# Patient Record
Sex: FEMALE
[De-identification: names, ages, dates, MRNs, and addresses within clinical notes are randomized per-mention and may not be internally consistent; named-entity substitution may affect disease eponyms.]

---

## 2018-04-18 ENCOUNTER — HOSPITAL ENCOUNTER (EMERGENCY)
Dept: HOSPITAL 18 - NAV ERS | Age: 23
Discharge: HOME | End: 2018-04-18
Payer: COMMERCIAL

## 2018-04-18 DIAGNOSIS — D64.9: ICD-10-CM

## 2018-04-18 DIAGNOSIS — R11.2: ICD-10-CM

## 2018-04-18 DIAGNOSIS — J45.909: ICD-10-CM

## 2018-04-18 DIAGNOSIS — N39.0: Primary | ICD-10-CM

## 2018-04-18 LAB
BACTERIA UR QL AUTO: (no result) HPF
RBC UR QL AUTO: (no result) HPF (ref 0–3)
SP GR UR STRIP: 1.02 (ref 1–1.03)
WBC UR QL AUTO: (no result) HPF (ref 0–3)

## 2018-04-18 PROCEDURE — 81003 URINALYSIS AUTO W/O SCOPE: CPT

## 2018-04-18 PROCEDURE — 81015 MICROSCOPIC EXAM OF URINE: CPT

## 2018-04-18 PROCEDURE — 99284 EMERGENCY DEPT VISIT MOD MDM: CPT

## 2018-05-05 ENCOUNTER — HOSPITAL ENCOUNTER (EMERGENCY)
Dept: HOSPITAL 18 - NAV ERS | Age: 23
Discharge: HOME | End: 2018-05-05
Payer: COMMERCIAL

## 2018-05-05 DIAGNOSIS — J04.0: Primary | ICD-10-CM

## 2018-05-05 DIAGNOSIS — J45.909: ICD-10-CM

## 2018-05-05 PROCEDURE — 70360 X-RAY EXAM OF NECK: CPT

## 2018-05-05 PROCEDURE — 87081 CULTURE SCREEN ONLY: CPT

## 2018-05-05 PROCEDURE — 87430 STREP A AG IA: CPT

## 2018-05-05 PROCEDURE — 87804 INFLUENZA ASSAY W/OPTIC: CPT

## 2018-05-05 PROCEDURE — 71046 X-RAY EXAM CHEST 2 VIEWS: CPT

## 2018-05-05 NOTE — RAD
CHEST TWO VIEWS:

5/5/18

 

HISTORY: 

23-year-old female with  history of cough and sore throat. 

 

Heart size is within normal limits. The lungs are clear. No pneumonia, edema, or pleural effusion. 

 

IMPRESSION:  

No acute intrathoracic disease. No evidence for pneumonia.

 

 

POS: [facility]

## 2018-05-05 NOTE — RAD
SOFT TISSUE NECK TWO VIEWS:

5/5/18

 

HISTORY: 

23-year-old female with history of cough and sore throat.

 

The epiglottis appears to be within normal limits. No evidence for an overt foreign body. No retropha
ryngeal soft tissue mass. The glottis and subglottic regions appear unremarkable. 

 

IMPRESSION:  

Unremarkable soft tissue neck. 

 

POS: [facility]

## 2018-11-23 ENCOUNTER — HOSPITAL ENCOUNTER (EMERGENCY)
Dept: HOSPITAL 18 - NAV ERS | Age: 23
Discharge: HOME | End: 2018-11-23
Payer: COMMERCIAL

## 2018-11-23 DIAGNOSIS — F32.9: ICD-10-CM

## 2018-11-23 DIAGNOSIS — S80.11XA: ICD-10-CM
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
## 2018-11-23 DIAGNOSIS — S06.9X9A: Primary | ICD-10-CM

## 2018-11-23 DIAGNOSIS — S80.12XA: ICD-10-CM

## 2018-11-23 DIAGNOSIS — J45.909: ICD-10-CM

## 2018-11-23 DIAGNOSIS — S05.11XA: ICD-10-CM

## 2018-11-23 DIAGNOSIS — F17.210: ICD-10-CM

## 2018-11-23 DIAGNOSIS — Z79.899: ICD-10-CM

## 2018-11-23 DIAGNOSIS — S01.311A: ICD-10-CM

## 2018-11-23 DIAGNOSIS — F41.9: ICD-10-CM

## 2018-11-23 PROCEDURE — 70486 CT MAXILLOFACIAL W/O DYE: CPT

## 2018-11-23 PROCEDURE — 70450 CT HEAD/BRAIN W/O DYE: CPT

## 2018-11-23 PROCEDURE — 71046 X-RAY EXAM CHEST 2 VIEWS: CPT
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
## 2019-03-01 ENCOUNTER — HOSPITAL ENCOUNTER (EMERGENCY)
Dept: HOSPITAL 18 - NAV ERS | Age: 24
Discharge: HOME | End: 2019-03-01
Payer: SELF-PAY

## 2019-03-01 DIAGNOSIS — J45.909: ICD-10-CM

## 2019-03-01 DIAGNOSIS — M94.0: ICD-10-CM

## 2019-03-01 DIAGNOSIS — Z79.899: ICD-10-CM

## 2019-03-01 DIAGNOSIS — J20.9: Primary | ICD-10-CM

## 2019-03-01 DIAGNOSIS — F17.210: ICD-10-CM

## 2019-03-01 DIAGNOSIS — F32.9: ICD-10-CM

## 2019-03-01 DIAGNOSIS — F41.9: ICD-10-CM

## 2019-03-01 PROCEDURE — 84484 ASSAY OF TROPONIN QUANT: CPT

## 2019-03-01 PROCEDURE — 93005 ELECTROCARDIOGRAM TRACING: CPT

## 2019-03-01 PROCEDURE — 71046 X-RAY EXAM CHEST 2 VIEWS: CPT

## 2019-03-01 PROCEDURE — 87804 INFLUENZA ASSAY W/OPTIC: CPT

## 2019-03-01 NOTE — RAD
TWO VIEWS CHEST:

3/1/19

 

HISTORY: 

Cough, congestion, chest pressure for two days.

 

PA and lateral views of the chest obtained on 3/1/19.

 

Comparison made to previous exam from 11/23/18. 

 

Two views chest demonstrate the lungs to be well aerated. No evidence of active intrathoracic disease
 seen. No evidence of effusions, pneumonia or pneumothorax seen. No evidence of free intraperitoneal 
air seen. 

 

IMPRESSION:  

Unremarkable two views chest. 

 

POS: [facility]

## 2019-12-28 ENCOUNTER — HOSPITAL ENCOUNTER (EMERGENCY)
Dept: HOSPITAL 18 - NAV ERS | Age: 24
Discharge: HOME | End: 2019-12-28
Payer: SELF-PAY

## 2019-12-28 DIAGNOSIS — Z79.899: ICD-10-CM

## 2019-12-28 DIAGNOSIS — J45.901: Primary | ICD-10-CM

## 2019-12-28 DIAGNOSIS — F32.9: ICD-10-CM

## 2019-12-28 DIAGNOSIS — Z87.891: ICD-10-CM

## 2019-12-28 DIAGNOSIS — F41.9: ICD-10-CM

## 2019-12-28 PROCEDURE — 96372 THER/PROPH/DIAG INJ SC/IM: CPT

## 2019-12-28 PROCEDURE — 94640 AIRWAY INHALATION TREATMENT: CPT

## 2020-01-26 ENCOUNTER — HOSPITAL ENCOUNTER (EMERGENCY)
Dept: HOSPITAL 18 - NAV ERS | Age: 25
Discharge: HOME | End: 2020-01-26
Payer: SELF-PAY

## 2020-01-26 DIAGNOSIS — F41.9: ICD-10-CM

## 2020-01-26 DIAGNOSIS — F17.210: ICD-10-CM

## 2020-01-26 DIAGNOSIS — Z79.899: ICD-10-CM

## 2020-01-26 DIAGNOSIS — F32.9: ICD-10-CM

## 2020-01-26 DIAGNOSIS — F10.129: Primary | ICD-10-CM
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
## 2020-01-26 DIAGNOSIS — J45.909: ICD-10-CM

## 2020-01-26 LAB
ALBUMIN SERPL BCG-MCNC: 4.5 G/DL (ref 3.5–5)
ALP SERPL-CCNC: 95 U/L (ref 40–110)
ALT SERPL W P-5'-P-CCNC: 20 U/L (ref 8–55)
ANION GAP SERPL CALC-SCNC: 17 MMOL/L (ref 10–20)
AST SERPL-CCNC: 16 U/L (ref 5–34)
BACTERIA UR QL AUTO: (no result) HPF
BASOPHILS # BLD AUTO: 0.1 THOU/UL (ref 0–0.2)
BASOPHILS NFR BLD AUTO: 1 % (ref 0–1)
BILIRUB SERPL-MCNC: 0.2 MG/DL (ref 0.2–1.2)
BUN SERPL-MCNC: 9 MG/DL (ref 7–18.7)
CALCIUM SERPL-MCNC: 9.2 MG/DL (ref 7.8–10.44)
CHLORIDE SERPL-SCNC: 108 MMOL/L (ref 98–107)
CO2 SERPL-SCNC: 22 MMOL/L (ref 22–29)
CREAT CL PREDICTED SERPL C-G-VRATE: 0 ML/MIN (ref 70–130)
DRUG SCREEN CUTOFF: (no result)
EOSINOPHIL # BLD AUTO: 0.3 THOU/UL (ref 0–0.7)
EOSINOPHIL NFR BLD AUTO: 2.7 % (ref 0–10)
GLOBULIN SER CALC-MCNC: 3.1 G/DL (ref 2.4–3.5)
GLUCOSE SERPL-MCNC: 105 MG/DL (ref 70–105)
HGB BLD-MCNC: 13.5 G/DL (ref 12–16)
LYMPHOCYTES # BLD AUTO: 3.1 THOU/UL (ref 1.2–3.4)
LYMPHOCYTES NFR BLD AUTO: 33.3 % (ref 21–51)
MCH RBC QN AUTO: 26.6 PG (ref 27–31)
MCV RBC AUTO: 80.3 FL (ref 78–98)
MEDTOX CONTROL LINE VALID?: (no result)
MONOCYTES # BLD AUTO: 0.5 THOU/UL (ref 0.11–0.59)
MONOCYTES NFR BLD AUTO: 4.8 % (ref 0–10)
NEUTROPHILS # BLD AUTO: 5.5 THOU/UL (ref 1.4–6.5)
NEUTROPHILS NFR BLD AUTO: 58.2 % (ref 42–75)
PLATELET # BLD AUTO: 350 THOU/UL (ref 130–400)
POTASSIUM SERPL-SCNC: 3.2 MMOL/L (ref 3.5–5.1)
PREGU CONTROL BACKGROUND?: (no result)
PREGU CONTROL BAR APPEAR?: YES
PROT UR STRIP.AUTO-MCNC: 30 MG/DL
RBC # BLD AUTO: 5.08 MILL/UL (ref 4.2–5.4)
RBC UR QL AUTO: (no result) HPF (ref 0–3)
SODIUM SERPL-SCNC: 144 MMOL/L (ref 136–145)
WBC # BLD AUTO: 9.4 THOU/UL (ref 4.8–10.8)
WBC UR QL AUTO: (no result) HPF (ref 0–3)

## 2020-01-26 PROCEDURE — 80306 DRUG TEST PRSMV INSTRMNT: CPT

## 2020-01-26 PROCEDURE — 70450 CT HEAD/BRAIN W/O DYE: CPT

## 2020-01-26 PROCEDURE — 80307 DRUG TEST PRSMV CHEM ANLYZR: CPT

## 2020-01-26 PROCEDURE — 81003 URINALYSIS AUTO W/O SCOPE: CPT

## 2020-01-26 PROCEDURE — 80053 COMPREHEN METABOLIC PANEL: CPT

## 2020-01-26 PROCEDURE — 81015 MICROSCOPIC EXAM OF URINE: CPT

## 2020-01-26 PROCEDURE — 81025 URINE PREGNANCY TEST: CPT

## 2020-01-26 PROCEDURE — 85025 COMPLETE CBC W/AUTO DIFF WBC: CPT

## 2020-01-26 NOTE — CT
CT BRAIN WITHOUT CONTRAST:

 

HISTORY:

Altered mental status.

 

COMPARISON:

11/23/2018

 

TECHNIQUE:

Multiple contiguous axial images were obtained in a CT of the brain without contrast.

 

FINDINGS:

The brain is normal in morphology and attenuation without focal lesions or confluent areas of infarct
ion. There is no evidence of hydrocephalus, intracranial hemorrhage or extraaxial fluid collection.

 

The calvarium and overlying soft tissues are unremarkable. The visualized paranasal sinuses and masto
id air cells are well aerated.

 

IMPRESSION:

No evidence of acute intracranial abnormality.

 

POS: [facility]

## 2020-07-01 ENCOUNTER — HOSPITAL ENCOUNTER (EMERGENCY)
Dept: HOSPITAL 18 - NAV ERS | Age: 25
LOS: 1 days | Discharge: HOME | End: 2020-07-02
Payer: SELF-PAY

## 2020-07-01 DIAGNOSIS — F32.9: ICD-10-CM

## 2020-07-01 DIAGNOSIS — R09.81: ICD-10-CM

## 2020-07-01 DIAGNOSIS — R05: ICD-10-CM

## 2020-07-01 DIAGNOSIS — R50.9: ICD-10-CM

## 2020-07-01 DIAGNOSIS — J02.9: Primary | ICD-10-CM

## 2020-07-01 DIAGNOSIS — F41.9: ICD-10-CM

## 2020-07-01 DIAGNOSIS — J45.909: ICD-10-CM

## 2020-07-01 DIAGNOSIS — Z20.828: ICD-10-CM

## 2020-07-01 DIAGNOSIS — J34.89: ICD-10-CM

## 2020-07-01 DIAGNOSIS — F17.210: ICD-10-CM

## 2020-07-01 PROCEDURE — 87430 STREP A AG IA: CPT

## 2020-07-01 PROCEDURE — 87081 CULTURE SCREEN ONLY: CPT

## 2020-07-01 PROCEDURE — 99283 EMERGENCY DEPT VISIT LOW MDM: CPT

## 2020-11-11 ENCOUNTER — HOSPITAL ENCOUNTER (EMERGENCY)
Dept: HOSPITAL 18 - NAV ERS | Age: 25
Discharge: HOME | End: 2020-11-11
Payer: SELF-PAY

## 2020-11-11 DIAGNOSIS — J45.909: ICD-10-CM

## 2020-11-11 DIAGNOSIS — F41.9: ICD-10-CM

## 2020-11-11 DIAGNOSIS — Z79.899: ICD-10-CM

## 2020-11-11 DIAGNOSIS — Z20.828: ICD-10-CM

## 2020-11-11 DIAGNOSIS — F17.210: ICD-10-CM

## 2020-11-11 DIAGNOSIS — R50.9: Primary | ICD-10-CM

## 2020-11-11 DIAGNOSIS — F32.9: ICD-10-CM

## 2020-11-11 DIAGNOSIS — R51.9: ICD-10-CM

## 2020-11-11 LAB
ALBUMIN SERPL BCG-MCNC: 4.2 G/DL (ref 3.5–5)
ALP SERPL-CCNC: 86 U/L (ref 40–110)
ALT SERPL W P-5'-P-CCNC: 32 U/L (ref 8–55)
ANION GAP SERPL CALC-SCNC: 13 MMOL/L (ref 10–20)
AST SERPL-CCNC: 18 U/L (ref 5–34)
BACTERIA UR QL AUTO: (no result) HPF
BILIRUB SERPL-MCNC: 0.6 MG/DL (ref 0.2–1.2)
BUN SERPL-MCNC: 14 MG/DL (ref 7–18.7)
CALCIUM SERPL-MCNC: 8.9 MG/DL (ref 7.8–10.44)
CHLORIDE SERPL-SCNC: 103 MMOL/L (ref 98–107)
CO2 SERPL-SCNC: 23 MMOL/L (ref 22–29)
CREAT CL PREDICTED SERPL C-G-VRATE: 0 ML/MIN (ref 70–130)
GLOBULIN SER CALC-MCNC: 3.2 G/DL (ref 2.4–3.5)
GLUCOSE SERPL-MCNC: 120 MG/DL (ref 70–105)
HGB BLD-MCNC: 13.8 G/DL (ref 12–16)
MCH RBC QN AUTO: 28.1 PG (ref 27–31)
MCV RBC AUTO: 85.3 FL (ref 78–98)
MDIFF COMPLETE?: YES
PLATELET # BLD AUTO: 272 THOU/UL (ref 130–400)
POTASSIUM SERPL-SCNC: 3.8 MMOL/L (ref 3.5–5.1)
RBC # BLD AUTO: 4.91 MILL/UL (ref 4.2–5.4)
RBC UR QL AUTO: (no result) HPF (ref 0–3)
SODIUM SERPL-SCNC: 135 MMOL/L (ref 136–145)
SP GR UR STRIP: 1.01 (ref 1–1.03)
WBC # BLD AUTO: 20.6 THOU/UL (ref 4.8–10.8)
WBC UR QL AUTO: (no result) HPF (ref 0–3)

## 2020-11-11 PROCEDURE — 81015 MICROSCOPIC EXAM OF URINE: CPT

## 2020-11-11 PROCEDURE — 87077 CULTURE AEROBIC IDENTIFY: CPT

## 2020-11-11 PROCEDURE — 96375 TX/PRO/DX INJ NEW DRUG ADDON: CPT

## 2020-11-11 PROCEDURE — 81003 URINALYSIS AUTO W/O SCOPE: CPT

## 2020-11-11 PROCEDURE — 80053 COMPREHEN METABOLIC PANEL: CPT

## 2020-11-11 PROCEDURE — 71045 X-RAY EXAM CHEST 1 VIEW: CPT

## 2020-11-11 PROCEDURE — 87086 URINE CULTURE/COLONY COUNT: CPT

## 2020-11-11 PROCEDURE — 87040 BLOOD CULTURE FOR BACTERIA: CPT

## 2020-11-11 PROCEDURE — 85025 COMPLETE CBC W/AUTO DIFF WBC: CPT

## 2020-11-11 PROCEDURE — 83605 ASSAY OF LACTIC ACID: CPT

## 2020-11-11 PROCEDURE — 96365 THER/PROPH/DIAG IV INF INIT: CPT

## 2020-11-11 PROCEDURE — U0003 INFECTIOUS AGENT DETECTION BY NUCLEIC ACID (DNA OR RNA); SEVERE ACUTE RESPIRATORY SYNDROME CORONAVIRUS 2 (SARS-COV-2) (CORONAVIRUS DISEASE [COVID-19]), AMPLIFIED PROBE TECHNIQUE, MAKING USE OF HIGH THROUGHPUT TECHNOLOGIES AS DESCRIBED BY CMS-2020-01-R: HCPCS

## 2020-11-11 PROCEDURE — 87081 CULTURE SCREEN ONLY: CPT

## 2020-11-11 PROCEDURE — 87430 STREP A AG IA: CPT

## 2020-11-11 PROCEDURE — 87635 SARS-COV-2 COVID-19 AMP PRB: CPT
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
## 2020-11-21 ENCOUNTER — HOSPITAL ENCOUNTER (EMERGENCY)
Dept: HOSPITAL 18 - NAV ERS | Age: 25
Discharge: HOME | End: 2020-11-21
Payer: SELF-PAY

## 2020-11-21 DIAGNOSIS — J20.9: Primary | ICD-10-CM

## 2020-11-21 DIAGNOSIS — Z20.828: ICD-10-CM

## 2020-11-21 DIAGNOSIS — F32.9: ICD-10-CM

## 2020-11-21 DIAGNOSIS — Z79.899: ICD-10-CM

## 2020-11-21 DIAGNOSIS — J45.909: ICD-10-CM

## 2020-11-21 DIAGNOSIS — F41.9: ICD-10-CM

## 2020-11-21 DIAGNOSIS — F17.210: ICD-10-CM

## 2020-11-21 PROCEDURE — 99284 EMERGENCY DEPT VISIT MOD MDM: CPT

## 2020-11-21 PROCEDURE — 87635 SARS-COV-2 COVID-19 AMP PRB: CPT

## 2020-11-21 PROCEDURE — U0003 INFECTIOUS AGENT DETECTION BY NUCLEIC ACID (DNA OR RNA); SEVERE ACUTE RESPIRATORY SYNDROME CORONAVIRUS 2 (SARS-COV-2) (CORONAVIRUS DISEASE [COVID-19]), AMPLIFIED PROBE TECHNIQUE, MAKING USE OF HIGH THROUGHPUT TECHNOLOGIES AS DESCRIBED BY CMS-2020-01-R: HCPCS

## 2020-11-21 PROCEDURE — 96372 THER/PROPH/DIAG INJ SC/IM: CPT

## 2021-04-16 ENCOUNTER — HOSPITAL ENCOUNTER (EMERGENCY)
Dept: HOSPITAL 18 - NAV ERS | Age: 26
Discharge: HOME | End: 2021-04-16
Payer: COMMERCIAL

## 2021-04-16 DIAGNOSIS — F17.210: ICD-10-CM

## 2021-04-16 DIAGNOSIS — R63.0: Primary | ICD-10-CM

## 2021-04-16 DIAGNOSIS — Z20.822: ICD-10-CM

## 2021-04-16 DIAGNOSIS — J45.909: ICD-10-CM

## 2021-04-16 PROCEDURE — U0003 INFECTIOUS AGENT DETECTION BY NUCLEIC ACID (DNA OR RNA); SEVERE ACUTE RESPIRATORY SYNDROME CORONAVIRUS 2 (SARS-COV-2) (CORONAVIRUS DISEASE [COVID-19]), AMPLIFIED PROBE TECHNIQUE, MAKING USE OF HIGH THROUGHPUT TECHNOLOGIES AS DESCRIBED BY CMS-2020-01-R: HCPCS

## 2021-04-16 PROCEDURE — 87635 SARS-COV-2 COVID-19 AMP PRB: CPT

## 2021-04-16 PROCEDURE — 99283 EMERGENCY DEPT VISIT LOW MDM: CPT

## 2021-04-16 PROCEDURE — U0005 INFEC AGEN DETEC AMPLI PROBE: HCPCS

## 2021-12-29 ENCOUNTER — HOSPITAL ENCOUNTER (EMERGENCY)
Dept: HOSPITAL 18 - NAV ERS | Age: 26
Discharge: HOME | End: 2021-12-29
Payer: COMMERCIAL

## 2021-12-29 DIAGNOSIS — J45.909: ICD-10-CM

## 2021-12-29 DIAGNOSIS — U07.1: Primary | ICD-10-CM

## 2021-12-29 DIAGNOSIS — Z79.899: ICD-10-CM

## 2021-12-29 DIAGNOSIS — F17.210: ICD-10-CM

## 2021-12-29 DIAGNOSIS — G43.909: ICD-10-CM

## 2021-12-29 PROCEDURE — U0005 INFEC AGEN DETEC AMPLI PROBE: HCPCS

## 2021-12-29 PROCEDURE — 87804 INFLUENZA ASSAY W/OPTIC: CPT

## 2021-12-29 PROCEDURE — 99283 EMERGENCY DEPT VISIT LOW MDM: CPT

## 2021-12-29 PROCEDURE — U0003 INFECTIOUS AGENT DETECTION BY NUCLEIC ACID (DNA OR RNA); SEVERE ACUTE RESPIRATORY SYNDROME CORONAVIRUS 2 (SARS-COV-2) (CORONAVIRUS DISEASE [COVID-19]), AMPLIFIED PROBE TECHNIQUE, MAKING USE OF HIGH THROUGHPUT TECHNOLOGIES AS DESCRIBED BY CMS-2020-01-R: HCPCS

## 2021-12-30 LAB — SARS-COV-2 RNA RESP QL NAA+PROBE: DETECTED

## 2022-06-27 ENCOUNTER — HOSPITAL ENCOUNTER (EMERGENCY)
Dept: HOSPITAL 18 - NAV ERS | Age: 27
Discharge: HOME | End: 2022-06-27
Payer: COMMERCIAL

## 2022-06-27 DIAGNOSIS — B34.9: ICD-10-CM

## 2022-06-27 DIAGNOSIS — Z20.822: ICD-10-CM

## 2022-06-27 DIAGNOSIS — J06.9: Primary | ICD-10-CM

## 2022-06-27 DIAGNOSIS — G43.909: ICD-10-CM

## 2022-06-27 DIAGNOSIS — Z79.899: ICD-10-CM

## 2022-06-27 PROCEDURE — 99283 EMERGENCY DEPT VISIT LOW MDM: CPT

## 2022-06-27 PROCEDURE — U0003 INFECTIOUS AGENT DETECTION BY NUCLEIC ACID (DNA OR RNA); SEVERE ACUTE RESPIRATORY SYNDROME CORONAVIRUS 2 (SARS-COV-2) (CORONAVIRUS DISEASE [COVID-19]), AMPLIFIED PROBE TECHNIQUE, MAKING USE OF HIGH THROUGHPUT TECHNOLOGIES AS DESCRIBED BY CMS-2020-01-R: HCPCS

## 2022-06-27 PROCEDURE — U0005 INFEC AGEN DETEC AMPLI PROBE: HCPCS

## 2022-12-08 ENCOUNTER — HOSPITAL ENCOUNTER (EMERGENCY)
Dept: HOSPITAL 18 - NAV ERS | Age: 27
Discharge: HOME | End: 2022-12-08
Payer: SELF-PAY

## 2022-12-08 DIAGNOSIS — Z20.822: ICD-10-CM

## 2022-12-08 DIAGNOSIS — B34.9: Primary | ICD-10-CM

## 2022-12-08 PROCEDURE — 87081 CULTURE SCREEN ONLY: CPT

## 2022-12-08 PROCEDURE — U0003 INFECTIOUS AGENT DETECTION BY NUCLEIC ACID (DNA OR RNA); SEVERE ACUTE RESPIRATORY SYNDROME CORONAVIRUS 2 (SARS-COV-2) (CORONAVIRUS DISEASE [COVID-19]), AMPLIFIED PROBE TECHNIQUE, MAKING USE OF HIGH THROUGHPUT TECHNOLOGIES AS DESCRIBED BY CMS-2020-01-R: HCPCS

## 2022-12-08 PROCEDURE — 96372 THER/PROPH/DIAG INJ SC/IM: CPT

## 2022-12-08 PROCEDURE — 87430 STREP A AG IA: CPT

## 2022-12-08 PROCEDURE — 99283 EMERGENCY DEPT VISIT LOW MDM: CPT

## 2022-12-08 PROCEDURE — 87804 INFLUENZA ASSAY W/OPTIC: CPT

## 2022-12-08 PROCEDURE — U0005 INFEC AGEN DETEC AMPLI PROBE: HCPCS

## 2023-03-24 ENCOUNTER — HOSPITAL ENCOUNTER (EMERGENCY)
Dept: HOSPITAL 18 - NAV ERS | Age: 28
Discharge: HOME | End: 2023-03-24
Payer: SELF-PAY

## 2023-03-24 DIAGNOSIS — G43.909: ICD-10-CM

## 2023-03-24 DIAGNOSIS — R13.10: Primary | ICD-10-CM

## 2023-03-24 DIAGNOSIS — J45.909: ICD-10-CM

## 2023-03-24 PROCEDURE — 99284 EMERGENCY DEPT VISIT MOD MDM: CPT

## 2023-03-24 PROCEDURE — 96372 THER/PROPH/DIAG INJ SC/IM: CPT

## 2023-04-24 ENCOUNTER — HOSPITAL ENCOUNTER (EMERGENCY)
Dept: HOSPITAL 18 - NAV ERS | Age: 28
Discharge: HOME | End: 2023-04-24
Payer: SELF-PAY

## 2023-04-24 DIAGNOSIS — A59.01: ICD-10-CM
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
## 2023-04-24 DIAGNOSIS — F17.210: ICD-10-CM

## 2023-04-24 DIAGNOSIS — S39.012A: Primary | ICD-10-CM

## 2023-04-24 DIAGNOSIS — R31.9: ICD-10-CM

## 2023-04-24 DIAGNOSIS — Z79.899: ICD-10-CM

## 2023-04-24 LAB
BACTERIA UR QL AUTO: (no result) HPF
PREGU CONTROL BACKGROUND?: (no result)
PREGU CONTROL BAR APPEAR?: YES
RBC UR QL AUTO: (no result) HPF (ref 0–3)
SP GR UR STRIP: 1.02 (ref 1–1.04)
T VAGINALIS URNS QL MICRO: (no result) HPF
WBC UR QL AUTO: (no result) HPF (ref 0–3)

## 2023-04-24 PROCEDURE — 74176 CT ABD & PELVIS W/O CONTRAST: CPT

## 2023-04-24 PROCEDURE — 81015 MICROSCOPIC EXAM OF URINE: CPT

## 2023-04-24 PROCEDURE — 81025 URINE PREGNANCY TEST: CPT

## 2023-04-24 PROCEDURE — 81003 URINALYSIS AUTO W/O SCOPE: CPT

## 2024-12-05 ENCOUNTER — HOSPITAL ENCOUNTER (EMERGENCY)
Dept: HOSPITAL 92 - CSHERS | Age: 29
Discharge: HOME | End: 2024-12-05
Payer: SELF-PAY

## 2024-12-05 DIAGNOSIS — J45.901: Primary | ICD-10-CM

## 2024-12-09 ENCOUNTER — HOSPITAL ENCOUNTER (EMERGENCY)
Dept: HOSPITAL 18 - NAV ERS | Age: 29
Discharge: HOME | End: 2024-12-09
Payer: SELF-PAY

## 2024-12-09 DIAGNOSIS — J45.909: Primary | ICD-10-CM

## 2024-12-09 PROCEDURE — 71045 X-RAY EXAM CHEST 1 VIEW: CPT
